# Patient Record
Sex: FEMALE | Race: WHITE | Employment: FULL TIME | URBAN - METROPOLITAN AREA
[De-identification: names, ages, dates, MRNs, and addresses within clinical notes are randomized per-mention and may not be internally consistent; named-entity substitution may affect disease eponyms.]

---

## 2019-02-25 ENCOUNTER — HOSPITAL ENCOUNTER (OUTPATIENT)
Dept: MAMMOGRAPHY | Age: 49
Discharge: HOME | End: 2019-02-25
Attending: OBSTETRICS & GYNECOLOGY
Payer: COMMERCIAL

## 2019-02-25 DIAGNOSIS — Z12.31 VISIT FOR SCREENING MAMMOGRAM: ICD-10-CM

## 2019-02-25 PROCEDURE — 77067 SCR MAMMO BI INCL CAD: CPT

## 2019-02-25 PROCEDURE — 77063 BREAST TOMOSYNTHESIS BI: CPT

## 2019-03-14 ENCOUNTER — HOSPITAL ENCOUNTER (OUTPATIENT)
Dept: ULTRASOUND IMAGING | Age: 49
Discharge: HOME | End: 2019-03-14
Attending: OBSTETRICS & GYNECOLOGY
Payer: COMMERCIAL

## 2019-03-14 ENCOUNTER — HOSPITAL ENCOUNTER (OUTPATIENT)
Dept: MAMMOGRAPHY | Age: 49
Discharge: HOME | End: 2019-03-14
Attending: OBSTETRICS & GYNECOLOGY
Payer: COMMERCIAL

## 2019-03-14 DIAGNOSIS — R92.8 ABNORMAL MAMMOGRAM: ICD-10-CM

## 2019-03-14 PROCEDURE — 77061 BREAST TOMOSYNTHESIS UNI: CPT

## 2019-03-14 PROCEDURE — 76642 ULTRASOUND BREAST LIMITED: CPT

## 2019-09-30 ENCOUNTER — HOSPITAL ENCOUNTER (OUTPATIENT)
Dept: MAMMOGRAPHY | Age: 49
Discharge: HOME | End: 2019-09-30
Attending: OBSTETRICS & GYNECOLOGY
Payer: COMMERCIAL

## 2019-09-30 ENCOUNTER — HOSPITAL ENCOUNTER (OUTPATIENT)
Dept: ULTRASOUND IMAGING | Age: 49
Discharge: HOME | End: 2019-09-30
Attending: OBSTETRICS & GYNECOLOGY
Payer: COMMERCIAL

## 2019-09-30 DIAGNOSIS — R92.8 ABNORMAL MAMMOGRAM: ICD-10-CM

## 2019-09-30 PROCEDURE — 77061 BREAST TOMOSYNTHESIS UNI: CPT

## 2019-09-30 PROCEDURE — 76642 ULTRASOUND BREAST LIMITED: CPT

## 2020-04-06 ENCOUNTER — HOSPITAL ENCOUNTER (OUTPATIENT)
Dept: MAMMOGRAPHY | Age: 50
Discharge: HOME | End: 2020-04-06
Attending: OBSTETRICS & GYNECOLOGY
Payer: COMMERCIAL

## 2020-04-06 ENCOUNTER — HOSPITAL ENCOUNTER (OUTPATIENT)
Dept: ULTRASOUND IMAGING | Age: 50
Discharge: HOME | End: 2020-04-06
Attending: OBSTETRICS & GYNECOLOGY
Payer: COMMERCIAL

## 2020-04-06 DIAGNOSIS — R92.8 ABNORMAL MAMMOGRAM: ICD-10-CM

## 2020-04-06 PROCEDURE — 77062 BREAST TOMOSYNTHESIS BI: CPT

## 2021-08-06 ENCOUNTER — TRANSCRIBE ORDER (OUTPATIENT)
Dept: SCHEDULING | Age: 51
End: 2021-08-06

## 2021-08-06 DIAGNOSIS — R94.5 NONSPECIFIC ABNORMAL RESULTS OF LIVER FUNCTION STUDY: Primary | ICD-10-CM

## 2021-08-16 ENCOUNTER — HOSPITAL ENCOUNTER (OUTPATIENT)
Age: 51
Discharge: HOME | End: 2021-08-16
Attending: PHYSICIAN ASSISTANT
Payer: COMMERCIAL

## 2021-08-16 DIAGNOSIS — R94.5 NONSPECIFIC ABNORMAL RESULTS OF LIVER FUNCTION STUDY: ICD-10-CM

## 2021-08-16 LAB — CREAT UR-MCNC: 0.9 MG/DL (ref 0.6–1.3)

## 2021-08-16 PROCEDURE — 74183 MRI ABD W/O CNTR FLWD CNTR: CPT

## 2021-08-16 PROCEDURE — 82565 ASSAY OF CREATININE: CPT

## 2021-08-16 PROCEDURE — 74011250636 HC RX REV CODE- 250/636: Performed by: PHYSICIAN ASSISTANT

## 2021-08-16 PROCEDURE — A9577 INJ MULTIHANCE: HCPCS | Performed by: PHYSICIAN ASSISTANT

## 2021-08-16 RX ADMIN — GADOBENATE DIMEGLUMINE 20 ML: 529 INJECTION, SOLUTION INTRAVENOUS at 14:17

## 2024-03-26 ENCOUNTER — HOSPITAL ENCOUNTER (EMERGENCY)
Facility: HOSPITAL | Age: 54
Discharge: HOME | End: 2024-03-27
Attending: STUDENT IN AN ORGANIZED HEALTH CARE EDUCATION/TRAINING PROGRAM
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
## 2024-03-27 NOTE — ED NOTES
Pt was seen and medically screened by Dr Pinon. Patient refused medical treatment in front of Officer BRAEDEN Salas on camera brought by [facility]. Released to custody of [facility].

## 2024-03-27 NOTE — ED NOTES
Patient arrived in custody of Officer (***)  Of [employer] / [facility] no. (***) Patient verbalized self/identify with their name and date of birth.Patient verified their allergies.  Patient has given a \"Request for blood alcohol test\" consent for alcohol blood draw; both verbally and with written consent obtained by patient; and informed/aware PD may test for narcotics, as verbally stated, and by written alcohol blood draw consent, as witnessed by myself in front of . Blood draw test kit provided by above same  (Box/Lot  # (***), and expiration date examined for seal and expiration, noting expiration date on kit (***), and kit then unsealed in front of patient and . Site prepped with  the 10% iodine pad provided in police department blood draw test kit, and  blood drawn from vein of patient with #22 gauge IV butterfly into two gray-topped tubes  that were also provided in the same blood draw test kit. Tubes labeled with the white/sealed in front of patient and , and then handed directly to same  (***). Patient then discharged, remaining in custody of law enforcement.

## 2024-03-27 NOTE — ED NOTES
Patient arrived in custody of Officer (BRAEDEN Salas)  Of [employer] / [employer] no. ([id number]) Patient verbalized self/identify with their name and date of birth.Patient verified their allergies.  Patient has given a \"Request for blood alcohol test\" consent for alcohol blood draw; both verbally and with written consent obtained by patient; and informed/aware PD may test for narcotics, as verbally stated, and by written alcohol blood draw consent, as witnessed by myself in front of . Blood draw test kit provided by above same  (Box/Lot  # [id number]), and expiration date examined for seal and expiration, noting expiration date on kit (06/30/2024), and kit then unsealed in front of patient and . Site prepped with  the 10% iodine pad provided in police department blood draw test kit, and  blood drawn from vein of patient with #22 gauge IV butterfly into two gray-topped tubes  that were also provided in the same blood draw test kit. Tubes labeled with the white/sealed in front of patient and , and then handed directly to same  (BRAEDEN Salas). Patient then discharged, remaining in custody of law enforcement.